# Patient Record
Sex: FEMALE | Race: WHITE
[De-identification: names, ages, dates, MRNs, and addresses within clinical notes are randomized per-mention and may not be internally consistent; named-entity substitution may affect disease eponyms.]

---

## 2019-08-08 ENCOUNTER — HOSPITAL ENCOUNTER (OUTPATIENT)
Dept: HOSPITAL 39 - MAMMO | Age: 44
End: 2019-08-08
Attending: OBSTETRICS & GYNECOLOGY
Payer: COMMERCIAL

## 2019-08-08 DIAGNOSIS — Z12.31: Primary | ICD-10-CM

## 2019-08-13 NOTE — MAM
EXAM DESCRIPTION: 

3D Screening BILATERAL : Digital Mammography.



CLINICAL HISTORY: 

44 years Female SCREENING . No complaints. No personal history of

breast cancer. Remote family history of breast cancer.

Childbirth. Premenopausal. No HRT.  Lifetime risk of developing

breast cancer (Tyrer-Cuzick model)(%):  8.8.



COMPARISON: 

Baseline study at this facility..  No prior reports available.  



TECHNIQUE: 

Bilateral CC and MLO projection full-field images, digital

tomosynthesis mammographic technique. Bilateral digital 2-D

full-field MLO images.  CAD not available for tomosynthesis or

2-D images.  



FINDINGS: 

The breast parenchymal density pattern is: Extremely dense breast

tissue, which lowers the sensitivity of mammography. No skin

thickening or nipple retraction.

No new focal, stellate mass or density, focal asymmetry , and no

suspicious microcalcifications bilaterally. 



IMPRESSION: 

BI-RADS CATEGORY: 1 - NEGATIVE

FOLLOW UP: Routine digital bilateral screening, one year interval

from August 2019.



Written communication explaining the findings and follow-up, will

be mailed to the patient and referring health care provider.



According to the [employer], yearly mammograms

are recommended starting at age 40 and continuing as long as a

woman is in good health.  Any breast change noted on a breast

self-exam should be reported promptly to the patient's healthcare

provider.  Breast MRI is recommended for women with an

approximately 20-25% or greater lifetime risk of breast cancer,

including women with a strong family history of breast or ovarian

cancer and women who have been treated for Hodgkin's disease.



A negative mammographic report should not delay tissue diagnosis

in patients with significant clinical history or physical

findings.



Extremely dense breast tissue limits the sensitivity of digital

mammography.



Electronically signed by:  Darrian Sher MD  8/13/2019 4:48 PM CDT

Workstation: [id number]